# Patient Record
Sex: FEMALE | Race: BLACK OR AFRICAN AMERICAN | Employment: OTHER | URBAN - METROPOLITAN AREA
[De-identification: names, ages, dates, MRNs, and addresses within clinical notes are randomized per-mention and may not be internally consistent; named-entity substitution may affect disease eponyms.]

---

## 2021-10-22 ENCOUNTER — APPOINTMENT (OUTPATIENT)
Dept: GENERAL RADIOLOGY | Facility: CLINIC | Age: 86
End: 2021-10-22
Attending: EMERGENCY MEDICINE
Payer: MEDICARE

## 2021-10-22 ENCOUNTER — APPOINTMENT (OUTPATIENT)
Dept: CT IMAGING | Facility: CLINIC | Age: 86
End: 2021-10-22
Attending: INTERNAL MEDICINE
Payer: MEDICARE

## 2021-10-22 ENCOUNTER — HOSPITAL ENCOUNTER (OUTPATIENT)
Facility: CLINIC | Age: 86
Setting detail: OBSERVATION
Discharge: HOME | End: 2021-10-23
Attending: EMERGENCY MEDICINE | Admitting: INTERNAL MEDICINE
Payer: MEDICARE

## 2021-10-22 DIAGNOSIS — J45.901 EXACERBATION OF ASTHMA, UNSPECIFIED ASTHMA SEVERITY, UNSPECIFIED WHETHER PERSISTENT: ICD-10-CM

## 2021-10-22 LAB
ANION GAP SERPL CALCULATED.3IONS-SCNC: 8 MMOL/L (ref 3–14)
ATRIAL RATE - MUSE: 88 BPM
BASOPHILS # BLD AUTO: 0.1 10E3/UL (ref 0–0.2)
BASOPHILS NFR BLD AUTO: 1 %
BUN SERPL-MCNC: 14 MG/DL (ref 7–30)
CALCIUM SERPL-MCNC: 9.8 MG/DL (ref 8.5–10.1)
CHLORIDE BLD-SCNC: 95 MMOL/L (ref 94–109)
CO2 SERPL-SCNC: 29 MMOL/L (ref 20–32)
CREAT SERPL-MCNC: 0.71 MG/DL (ref 0.52–1.04)
DIASTOLIC BLOOD PRESSURE - MUSE: NORMAL MMHG
EOSINOPHIL # BLD AUTO: 0.3 10E3/UL (ref 0–0.7)
EOSINOPHIL NFR BLD AUTO: 2 %
ERYTHROCYTE [DISTWIDTH] IN BLOOD BY AUTOMATED COUNT: 13.2 % (ref 10–15)
FLUAV RNA SPEC QL NAA+PROBE: NEGATIVE
FLUBV RNA RESP QL NAA+PROBE: NEGATIVE
GFR SERPL CREATININE-BSD FRML MDRD: 75 ML/MIN/1.73M2
GLUCOSE BLD-MCNC: 128 MG/DL (ref 70–99)
HCO3 BLDV-SCNC: 32 MMOL/L (ref 21–28)
HCT VFR BLD AUTO: 41.5 % (ref 35–47)
HGB BLD-MCNC: 13.3 G/DL (ref 11.7–15.7)
IMM GRANULOCYTES # BLD: 0 10E3/UL
IMM GRANULOCYTES NFR BLD: 0 %
INTERPRETATION ECG - MUSE: NORMAL
LACTATE BLD-SCNC: 1.8 MMOL/L
LYMPHOCYTES # BLD AUTO: 2.1 10E3/UL (ref 0.8–5.3)
LYMPHOCYTES NFR BLD AUTO: 18 %
MCH RBC QN AUTO: 27.9 PG (ref 26.5–33)
MCHC RBC AUTO-ENTMCNC: 32 G/DL (ref 31.5–36.5)
MCV RBC AUTO: 87 FL (ref 78–100)
MONOCYTES # BLD AUTO: 0.9 10E3/UL (ref 0–1.3)
MONOCYTES NFR BLD AUTO: 8 %
NEUTROPHILS # BLD AUTO: 8.4 10E3/UL (ref 1.6–8.3)
NEUTROPHILS NFR BLD AUTO: 71 %
NRBC # BLD AUTO: 0 10E3/UL
NRBC BLD AUTO-RTO: 0 /100
NT-PROBNP SERPL-MCNC: 101 PG/ML (ref 0–1800)
P AXIS - MUSE: 84 DEGREES
PCO2 BLDV: 59 MM HG (ref 40–50)
PH BLDV: 7.34 [PH] (ref 7.32–7.43)
PLATELET # BLD AUTO: 353 10E3/UL (ref 150–450)
PO2 BLDV: 37 MM HG (ref 25–47)
POTASSIUM BLD-SCNC: 3.7 MMOL/L (ref 3.4–5.3)
PR INTERVAL - MUSE: 156 MS
QRS DURATION - MUSE: 74 MS
QT - MUSE: 376 MS
QTC - MUSE: 454 MS
R AXIS - MUSE: 62 DEGREES
RBC # BLD AUTO: 4.76 10E6/UL (ref 3.8–5.2)
SAO2 % BLDV: 65 % (ref 94–100)
SARS-COV-2 RNA RESP QL NAA+PROBE: NEGATIVE
SODIUM SERPL-SCNC: 132 MMOL/L (ref 133–144)
SYSTOLIC BLOOD PRESSURE - MUSE: NORMAL MMHG
T AXIS - MUSE: 75 DEGREES
VENTRICULAR RATE- MUSE: 88 BPM
WBC # BLD AUTO: 11.7 10E3/UL (ref 4–11)

## 2021-10-22 PROCEDURE — 82803 BLOOD GASES ANY COMBINATION: CPT

## 2021-10-22 PROCEDURE — 99220 PR INITIAL OBSERVATION CARE,LEVEL III: CPT | Performed by: INTERNAL MEDICINE

## 2021-10-22 PROCEDURE — 83880 ASSAY OF NATRIURETIC PEPTIDE: CPT | Performed by: EMERGENCY MEDICINE

## 2021-10-22 PROCEDURE — G0378 HOSPITAL OBSERVATION PER HR: HCPCS

## 2021-10-22 PROCEDURE — C9803 HOPD COVID-19 SPEC COLLECT: HCPCS

## 2021-10-22 PROCEDURE — 250N000012 HC RX MED GY IP 250 OP 636 PS 637: Performed by: INTERNAL MEDICINE

## 2021-10-22 PROCEDURE — 250N000009 HC RX 250: Performed by: INTERNAL MEDICINE

## 2021-10-22 PROCEDURE — 250N000011 HC RX IP 250 OP 636: Performed by: EMERGENCY MEDICINE

## 2021-10-22 PROCEDURE — 96366 THER/PROPH/DIAG IV INF ADDON: CPT

## 2021-10-22 PROCEDURE — 93005 ELECTROCARDIOGRAM TRACING: CPT

## 2021-10-22 PROCEDURE — 71045 X-RAY EXAM CHEST 1 VIEW: CPT

## 2021-10-22 PROCEDURE — 36415 COLL VENOUS BLD VENIPUNCTURE: CPT | Performed by: EMERGENCY MEDICINE

## 2021-10-22 PROCEDURE — 250N000009 HC RX 250

## 2021-10-22 PROCEDURE — 96375 TX/PRO/DX INJ NEW DRUG ADDON: CPT

## 2021-10-22 PROCEDURE — 96365 THER/PROPH/DIAG IV INF INIT: CPT

## 2021-10-22 PROCEDURE — 80048 BASIC METABOLIC PNL TOTAL CA: CPT | Performed by: EMERGENCY MEDICINE

## 2021-10-22 PROCEDURE — 99285 EMERGENCY DEPT VISIT HI MDM: CPT | Mod: 25

## 2021-10-22 PROCEDURE — 87636 SARSCOV2 & INF A&B AMP PRB: CPT | Performed by: EMERGENCY MEDICINE

## 2021-10-22 PROCEDURE — 85025 COMPLETE CBC W/AUTO DIFF WBC: CPT | Performed by: EMERGENCY MEDICINE

## 2021-10-22 PROCEDURE — 71250 CT THORAX DX C-: CPT

## 2021-10-22 RX ORDER — ACETAMINOPHEN 650 MG/1
650 SUPPOSITORY RECTAL EVERY 6 HOURS PRN
Status: DISCONTINUED | OUTPATIENT
Start: 2021-10-22 | End: 2021-10-23 | Stop reason: HOSPADM

## 2021-10-22 RX ORDER — OMEPRAZOLE 20 MG/1
20 TABLET, DELAYED RELEASE ORAL
COMMUNITY

## 2021-10-22 RX ORDER — BENZONATATE 100 MG/1
100 CAPSULE ORAL 3 TIMES DAILY PRN
Status: DISCONTINUED | OUTPATIENT
Start: 2021-10-22 | End: 2021-10-23 | Stop reason: HOSPADM

## 2021-10-22 RX ORDER — ALBUTEROL SULFATE 90 UG/1
2 AEROSOL, METERED RESPIRATORY (INHALATION) EVERY 6 HOURS PRN
COMMUNITY

## 2021-10-22 RX ORDER — ALBUTEROL SULFATE 0.83 MG/ML
2.5 SOLUTION RESPIRATORY (INHALATION)
Status: DISCONTINUED | OUTPATIENT
Start: 2021-10-22 | End: 2021-10-23 | Stop reason: HOSPADM

## 2021-10-22 RX ORDER — MAGNESIUM SULFATE HEPTAHYDRATE 40 MG/ML
2 INJECTION, SOLUTION INTRAVENOUS ONCE
Status: COMPLETED | OUTPATIENT
Start: 2021-10-22 | End: 2021-10-22

## 2021-10-22 RX ORDER — IPRATROPIUM BROMIDE AND ALBUTEROL SULFATE 2.5; .5 MG/3ML; MG/3ML
SOLUTION RESPIRATORY (INHALATION)
Status: COMPLETED
Start: 2021-10-22 | End: 2021-10-22

## 2021-10-22 RX ORDER — IPRATROPIUM BROMIDE AND ALBUTEROL SULFATE 2.5; .5 MG/3ML; MG/3ML
6 SOLUTION RESPIRATORY (INHALATION) ONCE
Status: COMPLETED | OUTPATIENT
Start: 2021-10-22 | End: 2021-10-22

## 2021-10-22 RX ORDER — ACETAMINOPHEN 325 MG/1
325-650 TABLET ORAL EVERY 6 HOURS PRN
COMMUNITY
End: 2022-12-18

## 2021-10-22 RX ORDER — METHYLPREDNISOLONE SODIUM SUCCINATE 125 MG/2ML
125 INJECTION, POWDER, LYOPHILIZED, FOR SOLUTION INTRAMUSCULAR; INTRAVENOUS ONCE
Status: COMPLETED | OUTPATIENT
Start: 2021-10-22 | End: 2021-10-22

## 2021-10-22 RX ORDER — HYDROCHLOROTHIAZIDE 25 MG/1
25 TABLET ORAL DAILY
COMMUNITY

## 2021-10-22 RX ORDER — ONDANSETRON 2 MG/ML
4 INJECTION INTRAMUSCULAR; INTRAVENOUS EVERY 6 HOURS PRN
Status: DISCONTINUED | OUTPATIENT
Start: 2021-10-22 | End: 2021-10-23 | Stop reason: HOSPADM

## 2021-10-22 RX ORDER — ALBUTEROL SULFATE 0.83 MG/ML
2.5 SOLUTION RESPIRATORY (INHALATION) EVERY 6 HOURS PRN
COMMUNITY

## 2021-10-22 RX ORDER — ONDANSETRON 4 MG/1
4 TABLET, ORALLY DISINTEGRATING ORAL EVERY 6 HOURS PRN
Status: DISCONTINUED | OUTPATIENT
Start: 2021-10-22 | End: 2021-10-23 | Stop reason: HOSPADM

## 2021-10-22 RX ORDER — ACETAMINOPHEN 325 MG/1
650 TABLET ORAL EVERY 6 HOURS PRN
Status: DISCONTINUED | OUTPATIENT
Start: 2021-10-22 | End: 2021-10-23 | Stop reason: HOSPADM

## 2021-10-22 RX ORDER — CODEINE PHOSPHATE AND GUAIFENESIN 10; 100 MG/5ML; MG/5ML
1-2 SOLUTION ORAL EVERY 4 HOURS PRN
COMMUNITY
End: 2022-12-18

## 2021-10-22 RX ORDER — PREDNISONE 20 MG/1
40 TABLET ORAL DAILY
Status: DISCONTINUED | OUTPATIENT
Start: 2021-10-22 | End: 2021-10-23 | Stop reason: HOSPADM

## 2021-10-22 RX ORDER — IPRATROPIUM BROMIDE AND ALBUTEROL SULFATE 2.5; .5 MG/3ML; MG/3ML
3 SOLUTION RESPIRATORY (INHALATION)
Status: DISCONTINUED | OUTPATIENT
Start: 2021-10-22 | End: 2021-10-23 | Stop reason: HOSPADM

## 2021-10-22 RX ADMIN — IPRATROPIUM BROMIDE AND ALBUTEROL SULFATE 3 ML: 2.5; .5 SOLUTION RESPIRATORY (INHALATION) at 17:49

## 2021-10-22 RX ADMIN — MAGNESIUM SULFATE HEPTAHYDRATE 2 G: 40 INJECTION, SOLUTION INTRAVENOUS at 02:03

## 2021-10-22 RX ADMIN — METHYLPREDNISOLONE SODIUM SUCCINATE 125 MG: 125 INJECTION, POWDER, FOR SOLUTION INTRAMUSCULAR; INTRAVENOUS at 02:02

## 2021-10-22 RX ADMIN — IPRATROPIUM BROMIDE AND ALBUTEROL SULFATE 3 ML: 2.5; .5 SOLUTION RESPIRATORY (INHALATION) at 12:16

## 2021-10-22 RX ADMIN — IPRATROPIUM BROMIDE AND ALBUTEROL SULFATE 3 ML: 2.5; .5 SOLUTION RESPIRATORY (INHALATION) at 08:31

## 2021-10-22 RX ADMIN — IPRATROPIUM BROMIDE AND ALBUTEROL SULFATE 6 ML: .5; 3 SOLUTION RESPIRATORY (INHALATION) at 01:57

## 2021-10-22 RX ADMIN — IPRATROPIUM BROMIDE AND ALBUTEROL SULFATE 6 ML: 2.5; .5 SOLUTION RESPIRATORY (INHALATION) at 01:57

## 2021-10-22 RX ADMIN — PREDNISONE 40 MG: 20 TABLET ORAL at 08:30

## 2021-10-22 RX ADMIN — IPRATROPIUM BROMIDE AND ALBUTEROL SULFATE 3 ML: 2.5; .5 SOLUTION RESPIRATORY (INHALATION) at 21:20

## 2021-10-22 ASSESSMENT — ENCOUNTER SYMPTOMS
COUGH: 1
VOMITING: 0
DIARRHEA: 0
SHORTNESS OF BREATH: 1
FEVER: 0

## 2021-10-22 NOTE — ED NOTES
[facility]  ED Nurse Handoff Report    Martha Thomas is a 89 year old female   ED Chief complaint: Wheezing  . ED Diagnosis:   Final diagnoses:   Exacerbation of asthma, unspecified asthma severity, unspecified whether persistent     Allergies:   Allergies   Allergen Reactions     Penicillins      Other reaction(s): Seizures       Code Status: Full Code  Activity level - Baseline/Home:  Independent. Activity Level - Current:   Assist X 1. Lift room needed: No. Bariatric: No   Needed: Yes   Isolation: No. Infection: Not Applicable.     Vital Signs:   Vitals:    10/22/21 0141 10/22/21 0200   BP: (!) 151/80 (!) 146/73   Pulse: 93 87   Resp: 20    Temp: 98.4  F (36.9  C)    TempSrc: Oral    SpO2: 96% 99%       Cardiac Rhythm:  ,   Cardiac  Cardiac Rhythm: Normal sinus rhythm  Pain level:    Patient confused: No. Patient Falls Risk: Yes.   Elimination Status: Has voided   Patient Report - Initial Complaint: Pt to ER with c/o increasing SOB. Focused Assessment:  Pt with hx of asthma, sob worsening despite inhaler at home,  Pt much improved after 2 nebs in ER but cont to have residual wheezing  Tests Performed: Labs CXR . Abnormal Results: WBC 11.7 all others WNLs.   Treatments provided: Meds   Family Comments: son at bedside translating  OBS brochure/video discussed/provided to patient:  Yes  ED Medications:   Medications   magnesium sulfate 2 g in water intermittent infusion (2 g Intravenous New Bag 10/22/21 0203)   ipratropium - albuterol 0.5 mg/2.5 mg/3 mL (DUONEB) neb solution 6 mL (6 mLs Nebulization Given 10/22/21 0157)   methylPREDNISolone sodium succinate (solu-MEDROL) injection 125 mg (125 mg Intravenous Given 10/22/21 0202)     Drips infusing:  No  For the majority of the shift, the patient's behavior Green. Interventions performed wereN/A.    Sepsis treatment initiated: No     Patient tested for COVID 19 prior to admission: YES    ED Nurse Name/Phone Number: Dary Perla RN,    3:14 AM    RECEIVING UNIT ED HANDOFF REVIEW    Above ED Nurse Handoff Report was reviewed: Yes  Reviewed by: Fe Gamboa RN on October 22, 2021 at 2:32 PM

## 2021-10-22 NOTE — PHARMACY-ADMISSION MEDICATION HISTORY
Admission medication history interview status for this patient is complete. See [facility] admission navigator for allergy information, prior to admission medications and immunization status.     Medication history interview done, indicate source(s): Patient and Family  Medication history resources (including written lists, pill bottles, clinic record):None  Pharmacy: [facility]    Changes made to PTA medication list:  Added: All meds  Changed: None  Reported as Not Taking: None  Removed: None    Actions taken by pharmacist (provider contacted, etc):None     Additional medication history information:None    Medication reconciliation/reorder completed by provider prior to medication history?  N   (Y/N)     Prior to Admission medications    Medication Sig Last Dose Taking? Auth Provider   acetaminophen (TYLENOL) 325 MG tablet Take 325-650 mg by mouth every 6 hours as needed for mild pain prn at prn Yes Unknown, Entered By History   albuterol (PROAIR HFA/PROVENTIL HFA/VENTOLIN HFA) 108 (90 Base) MCG/ACT inhaler Inhale 2 puffs into the lungs every 6 hours as needed for shortness of breath / dyspnea or wheezing prn at prn Yes Unknown, Entered By History   albuterol (PROVENTIL) (2.5 MG/3ML) 0.083% neb solution Take 2.5 mg by nebulization every 6 hours as needed for shortness of breath / dyspnea or wheezing prn at prn Yes Unknown, Entered By History   guaiFENesin-codeine (ROBITUSSIN AC) 100-10 MG/5ML solution Take 1-2 teaspoonful by mouth every 4 hours as needed for cough prn at prn Yes Unknown, Entered By History   hydrochlorothiazide (HYDRODIURIL) 25 MG tablet Take 25 mg by mouth daily 10/21/2021 at am Yes Unknown, Entered By History   omeprazole (PRILOSEC OTC) 20 MG EC tablet Take 20 mg by mouth 2 times daily 10/21/2021 at x2 Yes Unknown, Entered By History

## 2021-10-22 NOTE — ED TRIAGE NOTES
Wheezing tonight per family. Family also notes HTN. Audible wheezing in triage. ABCs otherwise intact GCS 15

## 2021-10-22 NOTE — PROGRESS NOTES
Agree with H&P from Valentina Cates MD this morning.  I did evaluate the patient and spoke with her daughter.  She continues to have a dry cough.  Her shortness of breath feels a little bit improved and she is comfortable at rest.  CT of the chest shows some apical scarring that would account for the crackles heard on exam.  -Continue prednisone 40 mg daily  -DuoNebs  -Cough suppressants as needed  -Likely discharge tomorrow if ambulating and not hypoxic

## 2021-10-22 NOTE — H&P
Admitted: 10/22/2021    CHIEF COMPLAINT:  Cough, shortness of breath.    HISTORY OF PRESENT ILLNESS:  History is obtained from the patient as well as family members.  Her grandson assisted with interpretation. This is a 89-year-old Citizen of [place] female who speaks Creole who has an underlying history of asthma and hypertension.  She lives with her daughter.  The patient has been having a nonproductive cough for a couple of weeks, has been taking Tylenol with Codeine however, it has not been getting better.  She has been progressively more short winded with activity and also her blood pressure was noted to be elevated and hence she was brought into the ER for further evaluation.  Per the family, she also had some wheezing.  She denies any pain in her chest.  The patient is vaccinated with COVID Moderna vaccine x 2.  Initially when she first presented here to the ER, there was a fair amount of wheezing, but at no point was she hypoxic.  She is seen by Dr. Lay and I am asked to admit her for further evaluation.    PAST MEDICAL HISTORY:  Significant for hypertension, asthma.    PAST SURGICAL HISTORY:  Significant for cataract surgery.    MEDICATIONS:  Her home medication list includes:  1.  Albuterol inhaler.  2.  Hydrochlorothiazide.    3.  Robitussin.  4.  Albuterol nebs.    SOCIAL HISTORY:  She does not smoke or drink alcohol.    FAMILY HISTORY:  Significant for dementia in her sister.    REVIEW OF SYSTEMS:  The patient denies any chest pain.  She denies any fevers or chills.  She has never been to an intensive care unit or required intubation for asthma. No orthopnea. There has been no specific trigger at this point in time.  All other systems are reviewed and deemed unremarkable and negative.    PHYSICAL EXAMINATION:    VITAL SIGNS:  Her temperature is 98.7, her pulse is 83, her blood pressure is 146/73, respiratory rate 20, O2 sat is 99% on room air.  GENERAL:  She is alert, awake, appears comfortable, in no acute  distress.  HEENT:  Pupils equal, round, reactive to light.  Her pharynx, there is no exudate noted.  LUNGS:  Actually she has crackles in her bases bilaterally.  I do not hear any active wheezing at this point in time.  HEART:  Regular rate.  S1, S2 normal.  ABDOMEN:  Soft, nontender, nondistended with good bowel sounds.  EXTREMITIES:  There is no edema.  SKIN:  There is no rash.   NEUROLOGICAL:  She moves all extremities.    LABORATORY DATA:  Obtained here shows the following:  A basic metabolic panel was obtained, which is grossly unremarkable other than a sodium of 132.  Her lactic acid is 1.8.  Her BNP is 101.  Her venous blood gas showed a pH of 7.34 with a pCO2 of 59, and a bicarbonate of 32.  On a CBC, her white cell count is 11.7 with an absolute neutrophil count of 18.7.  Her influenza and COVID test are read as negative.  An EKG obtained on her shows normal sinus rhythm at 88 beats per minute.  A chest x-ray 1 view showed biapical pleural thickening, basilar fibroid atelectasis.  Remote right clavicle and right rib fractures.    ASSESSMENT AND PLAN:    1.  Acute respiratory distress, likely due to asthma exacerbation; however, seems to be improving.  Nonetheless, she has a fair amount of crackles in her lungs.  Her BNP is normal.  I will admit her under observation status.  I will get a CT of her chest without contrast to further evaluate her lung parenchyma.  For now, we will continue with oral prednisone and neb treatments.  She is not hypoxic.  2.  Hypertension.  She will need resumption of her home meds.    CODE STATUS:  By default will be full code.  This will need to be addressed in the morning with her healthcare agent.    DISPOSITION:  She will be admitted under observation status.    Valentina Cates MD        D: 10/22/2021   T: 10/22/2021   MT: DEEP    Name:     ANISHA ARCHULETA  MRN:      [id number]        Account:     [id number]   :      1931           Admitted:    10/22/2021        Document: [id number]

## 2021-10-22 NOTE — ED PROVIDER NOTES
History     Chief Complaint:  Wheezing      HPI Patient's grandson and daughter over the phone are interpreting for the patient    Martha Thomas is a 89 year old female who presents with difficulty breathing.  Patient has a history of asthma.  Today she became increasingly more short of breath and family members noted audible wheezing.  She was using her albuterol inhaler but without significant improvement.  Patient did not report any additional symptoms including fever, vomiting, diarrhea, chest pain or any other concerns.  Family members checked her blood pressure and noticed that it was elevated at 150-160 systolic.  They were monitoring her home oxygen levels which were were reported as normal.  Patient is fully vaccinated against COVID-19 with Moderna vaccine x2.  Further history limited secondary to respiratory distress.    Review of Systems   Constitutional: Negative for fever.   Respiratory: Positive for cough and shortness of breath.    Cardiovascular: Negative for chest pain.   Gastrointestinal: Negative for diarrhea and vomiting.   All other systems reviewed and are negative.    Allergies:  Penicillin    Medications:    Albuterol  Hydrochlorothiazide  Codeine    Past Medical History:    Asthma  HTN    Surgical History:  Cataract removal    Social History:  Presents to the ED with grandson    Physical Exam     Patient Vitals for the past 24 hrs:   BP Temp Temp src Pulse Resp SpO2   10/22/21 0200 (!) 146/73 -- -- 87 -- 99 %   10/22/21 0141 (!) 151/80 98.4  F (36.9  C) Oral 93 20 96 %       Physical Exam      Eyes:    Conjunctiva normal  Neck:    Supple, no meningismus.     CV:     Regular rate and rhythm.      No murmurs, rubs or gallops.       No unilateral leg swelling.       2+ radial pulses bilateral.       No lower extremity edema.  PULM:    Moderate-severe diffuse expiratory wheezing.       Mild respiratory distress.      Diminished air exchange.     No rales or rhonci.     No stridor.  ABD:     Soft, non-tender, non-distended.       No rebound, guarding or rigidity.  MSK:     No gross deformity to all four extremities.   LYMPH:   No cervical lymphadenopathy.  NEURO:   Alert     Good muscle tone  Skin:    Warm, dry and intact.    Psych:    Mood is good and affect is appropriate.    Emergency Department Course   ECG:  ECG taken at 0157, ECG read at 0206  Normal sinus rhythm   Rate 88 bpm. NV interval 156 ms. QRS duration 74 ms. QT/QTc 376/454 ms. P-R-T axes 84 62 75.     Imaging:  XR Chest Port 1 View:  Cardiomediastinal silhouette is within normal limits. No focal consolidation or pleural effusion. Biapical pleural thickening. Basilar fibroatelectasis. Remote right clavicle and right rib fractures.  Report per radiology.    Laboratory:  CBC: WBC 11.7 (H), HGB 13.3,   BMP: Glucose 128 (H), sodium 132 (L), o/w WNL (Creatinine 0.71)    N-Terminal Pro BNP: 101    iStat gases with lactate (resulted 0209): pH Venous 7.34, PCO2 Venous 59 (H), PO2 Venous 37, Bicarbonate 32 (H), pCO2 Venous 59 (H), O2 saturations 65 (L), Lactate 1.8     Symptomatic SARS-CoV2 (COVID-19) Virus: negative  Influenza A/B Virus: negative     Procedures:  None.    Emergency Department Course:    Reviewed:  I reviewed nursing notes, vitals, past history and care everywhere    Assessments:   I obtained history and examined the patient in room 02 as noted above.   0300 I rechecked the patient and explained findings.     Consults:    I consulted with Dr. Cates of the hospitalist service regarding patient, who agrees to admit patient to hospital in monitored medical bed.     Interventions:  0157 Duoneb 6 mLs nebulization  0202 Solu-medrol 125 mg IV  0203 Magnesium sulfate 2 g IV Bolus    Disposition:  The patient was admitted to the hospital under the care of Dr. Cates.    Impression & Plan      Medical Decision Makin-year-old female presented to the ED with 1 day of difficulty breathing.  She has a history of asthma and has  clear evidence of acute asthma exacerbation.  She was in mild distress with tachypnea, wheezing but no hypoxia.  She responded quite well to bronchodilators and IV Solu-Medrol.  Chest x-ray has ruled out associated pulmonary edema, pneumonia, pneumothorax.  COVID/influenza swabs negative.  On reexamination, she feels much improved, non oxygen dependent but has mild residual wheezing with respiratory rate in the mid to upper 20s.  She is unfit to be discharged home.  She will be admitted to a medical bed for ongoing management of acute asthma exacerbation.    Covid-19  Martha Thomas was evaluated during a global COVID-19 pandemic, which necessitated consideration that the patient might be at risk for infection with the SARS-CoV-2 virus that causes COVID-19. Applicable protocols for evaluation were followed during the patient's care. COVID-19 was considered as part of the patient's evaluation. A test was obtained during this visit.    Diagnosis:    ICD-10-CM    1. Exacerbation of asthma, unspecified asthma severity, unspecified whether persistent  J45.901        Scribe Disclosure:  Niesha KOCH, am serving as a scribe at 1:53 AM on 10/22/2021 to document services personally performed by Dino Lay MD based on my observations and the provider's statements to me.          Dino Lay MD  10/22/21 0331

## 2021-10-22 NOTE — PLAN OF CARE
PRIMARY DIAGNOSIS: ASTHMA  OUTPATIENT/OBSERVATION GOALS TO BE MET BEFORE DISCHARGE:  1. Vital signs stable: Yes    2. Improvement of peak flow to greater than 70% sustained off nebulizer for 4 hours:  N/A    3. Dyspnea improved and O2 sats >88% at RA or at prior home O2 therapy level: Yes      SpO2: 95 %, O2 Device: None (Room air)    4. Short term supplemental O2 needed for use with activity at home: No    5. Tolerating adequate PO diet and medications: Yes    6. Return to near baseline physical activity: Yes    Discharge Planner Nurse   Safe discharge environment identified: Yes  Barriers to discharge: Yes       Entered by: Fe Gamboa 10/22/2021 4:36 PM     Please review provider order for any additional goals.   Nurse to notify provider when observation goals have been met and patient is ready for discharge.     End of Shift Note  See flowsheets for vital signs and complete assessments.    Pertinent Assessments: A&Ox4. O2 stable on room air, OBANDO noted. Denies pain, chest pain, nausea, numbness & tingling. Skin intact. Up SBA.     Major Shift Events: Admitted to the unit.    Treatment Plan: Scheduled nebs, symptom management.      Bedside RN: Fe Gamboa

## 2021-10-22 NOTE — PLAN OF CARE
ROOM # 206-2    Living Situation (if not independent, order SW consult): Independent, home with daughter  Facility name:  :     Activity level at baseline: Independent  Activity level on admit: SBA      Patient registered to observation; given Patient Bill of Rights; given the opportunity to ask questions about observation status and their plan of care.  Patient has been oriented to the observation room, bathroom and call light is in place.    Discussed discharge goals and expectations with patient/family.

## 2021-10-23 VITALS
WEIGHT: 114.3 LBS | HEART RATE: 84 BPM | OXYGEN SATURATION: 94 % | RESPIRATION RATE: 16 BRPM | TEMPERATURE: 97.7 F | DIASTOLIC BLOOD PRESSURE: 56 MMHG | SYSTOLIC BLOOD PRESSURE: 124 MMHG

## 2021-10-23 PROCEDURE — G0378 HOSPITAL OBSERVATION PER HR: HCPCS

## 2021-10-23 PROCEDURE — 250N000009 HC RX 250: Performed by: INTERNAL MEDICINE

## 2021-10-23 PROCEDURE — 99217 PR OBSERVATION CARE DISCHARGE: CPT | Performed by: STUDENT IN AN ORGANIZED HEALTH CARE EDUCATION/TRAINING PROGRAM

## 2021-10-23 PROCEDURE — 250N000012 HC RX MED GY IP 250 OP 636 PS 637: Performed by: INTERNAL MEDICINE

## 2021-10-23 RX ORDER — PREDNISONE 20 MG/1
40 TABLET ORAL DAILY
Qty: 4 TABLET | Refills: 0 | Status: SHIPPED | OUTPATIENT
Start: 2021-10-24 | End: 2022-12-18

## 2021-10-23 RX ADMIN — IPRATROPIUM BROMIDE AND ALBUTEROL SULFATE 3 ML: 2.5; .5 SOLUTION RESPIRATORY (INHALATION) at 09:39

## 2021-10-23 RX ADMIN — PREDNISONE 40 MG: 20 TABLET ORAL at 07:51

## 2021-10-23 NOTE — PLAN OF CARE
PRIMARY DIAGNOSIS: ASTHMA  OUTPATIENT/OBSERVATION GOALS TO BE MET BEFORE DISCHARGE:  1. Vital signs stable: Yes    2. Improvement of peak flow to greater than 70% sustained off nebulizer for 4 hours: N/A    3. Dyspnea improved and O2 sats >88% at RA or at prior home O2 therapy level: Yes      SpO2: 96 %, O2 Device: None (Room air)    4. Short term supplemental O2 needed for use with activity at home: No    5. Tolerating adequate PO diet and medications: Yes    6. Return to near baseline physical activity: Yes    Discharge Planner Nurse   Safe discharge environment identified: Yes  Barriers to discharge: No       Entered by: Fe Gamboa 10/23/2021 8:03 AM     Please review provider order for any additional goals.   Nurse to notify provider when observation goals have been met and patient is ready for discharge.

## 2021-10-23 NOTE — PROGRESS NOTES
Patient's After Visit Summary was reviewed with patient and/or daughter.   Patient verbalized understanding of After Visit Summary, recommended follow up and was given an opportunity to ask questions.   Discharge medications sent home with patient/family: Not applicable   Discharged with daughter

## 2021-10-23 NOTE — DISCHARGE SUMMARY
[facility]  Discharge Summary  Name: Martha Thomas    MRN: [id number]  YOB: 1931    Age: 89 year old  Date of Discharge:  10/23/2021 11:07 AM  Date of Admission: 10/22/2021  Primary Care Provider: Emiliano Yu  Discharge Physician:  Luiz Rowley DO  Discharging Service:  Hospitalist      Hospital Course  Patient is a 89-year-old [place] female who speaks Creole has a past medical history of asthma and hypertension who was admitted on 10/22 with a nonproductive cough for the past couple weeks.  She has been progressively more short winded with activity and her blood pressure is elevated and therefore she presented to the hospital.  She was also found to have some wheezing on admission per report of the family and the ED examination was consistent with this.  At no point she was hypoxemic.  She was admitted to the hospital to be treated for a mild asthma exacerbation.  She was initiated on steroid burst, nebulizers with subsequent improvement in her clinical status.  On the day of discharge she was saturating fine on room air with rest and exertion.  Her daughter also reported that she appeared visibly better.  She was discharged with a total 5-day course of prednisone.    Discharge Diagnoses:  Acute respiratory distress, likely due to asthma exacerbation:  Patient presented with wheezing on examination.  In the setting of an asthma history she was treated for an asthma exacerbation.  Crackles were also noted on her pulmonary examination and therefore a CT chest was completed which was consistent with moderate biapical subpleural scarring and bilateral upper lung subpleural interstitial thickening which may also be consistent with scarring or other interstitial lung disease.  Ultimately she improved on steroid burst and nebulizers and was discharged without need for supplemental oxygen.  Patient follow-up with her primary doctor for ongoing cares.    Hypertension:  She  will need resumption of her home meds.    Discharge Disposition:  Discharged to home    Allergies:  Allergies   Allergen Reactions     Penicillins      Other reaction(s): Seizures        Discharge Medications:        Review of your medicines      START taking      Dose / Directions   predniSONE 20 MG tablet  Commonly known as: DELTASONE  Used for: Exacerbation of asthma, unspecified asthma severity, unspecified whether persistent      Dose: 40 mg  Start taking on: October 24, 2021  Take 2 tablets (40 mg) by mouth daily  Quantity: 4 tablet  Refills: 0        CONTINUE these medicines which have NOT CHANGED      Dose / Directions   acetaminophen 325 MG tablet  Commonly known as: TYLENOL      Dose: 325-650 mg  Take 325-650 mg by mouth every 6 hours as needed for mild pain  Refills: 0     * albuterol 108 (90 Base) MCG/ACT inhaler  Commonly known as: PROAIR HFA/PROVENTIL HFA/VENTOLIN HFA      Dose: 2 puff  Inhale 2 puffs into the lungs every 6 hours as needed for shortness of breath / dyspnea or wheezing  Refills: 0     * albuterol (2.5 MG/3ML) 0.083% neb solution  Commonly known as: PROVENTIL      Dose: 2.5 mg  Take 2.5 mg by nebulization every 6 hours as needed for shortness of breath / dyspnea or wheezing  Refills: 0     guaiFENesin-codeine 100-10 MG/5ML solution  Commonly known as: ROBITUSSIN AC      Dose: 1-2 teaspoonful  Take 1-2 teaspoonful by mouth every 4 hours as needed for cough  Refills: 0     hydrochlorothiazide 25 MG tablet  Commonly known as: HYDRODIURIL  Notes to patient: Resume home schedule      Dose: 25 mg  Take 25 mg by mouth daily  Refills: 0     omeprazole 20 MG EC tablet  Commonly known as: priLOSEC OTC  Notes to patient: Resume home schedule      Dose: 20 mg  Take 20 mg by mouth 2 times daily  Refills: 0         * This list has 2 medication(s) that are the same as other medications prescribed for you. Read the directions carefully, and ask your doctor or other care provider to review them with you.                Where to get your medicines      These medications were sent to [facility] - [place], [place] - [address]  [id number], [facility] [address]    Phone: [phone]     predniSONE 20 MG tablet         Condition on Discharge:  Discharge condition: Good       Code status on discharge: Full Code     History of Illness:  See detailed admission note for full details.    Physical Exam:  Vital signs:  Temp: 97.7  F (36.5  C) Temp src: Oral BP: 124/56 Pulse: 84   Resp: 16 SpO2: 94 % O2 Device: None (Room air)     Weight: 51.8 kg (114 lb 4.8 oz)  There is no height or weight on file to calculate BMI.    Wt Readings from Last 1 Encounters:   10/22/21 51.8 kg (114 lb 4.8 oz)     General: Alert, awake, no acute distress.  HEENT: NC/AT, eyes anicteric, external occular movements intact, face symmetric.  Dentition WNL, MM moist.  Cardiac: RRR, S1, S2.  No murmurs appreciated.  Pulmonary: Normal chest rise, normal work of breathing.  Lungs CTA BL  Abdomen: soft, non-tender, non-distended.  Bowel Sounds Present.  No guarding.  Extremities: no deformities.  Warm, well perfused.  Skin: no rashes or lesions noted.  Warm and Dry.  Neuro: No focal deficits noted.  Speech clear.  Coordination and strength grossly normal.  Psych: Appropriate affect.    Procedures other than Imaging:  None     Imaging:  No results found for this or any previous visit (from the past 24 hour(s)).     Consultations:  No consultations were requested during this admission.       Recent Lab Results:  Recent Labs   Lab 10/22/21  0159   WBC 11.7*   HGB 13.3   HCT 41.5   MCV 87             Lab Results   Component Value Date     10/22/2021    Lab Results   Component Value Date    CHLORIDE 95 10/22/2021    Lab Results   Component Value Date    BUN 14 10/22/2021      Lab Results   Component Value Date    POTASSIUM 3.7 10/22/2021    Lab Results   Component Value Date    CO2 29 10/22/2021    Lab Results   Component  Value Date    CR 0.71 10/22/2021             Pending Results:    Unresulted Labs Ordered in the Past 30 Days of this Admission     No orders found for last 31 day(s).           Discharge Instructions and Follow-Up:   Discharge Procedure Orders   Reason for your hospital stay   Order Comments: You were in the hospital to be treated for an asthma exacerbation. This was treated with steroids by mouth and nebulizer. You have improved. You will continue to take oral steroids for 4 more days. Please complete this medication.     Follow-up and recommended labs and tests    Order Comments: [facility]- [facility].  No follow up labs or test are needed.     Activity   Order Comments: Your activity upon discharge: activity as tolerated     Order Specific Question Answer Comments   Is discharge order? Yes      Diet   Order Comments: Follow this diet upon discharge: Orders Placed This Encounter      Regular Diet Adult     Order Specific Question Answer Comments   Is discharge order? Yes        Total time spent in face to face contact with the patient and coordinating discharge was:  <30 Minutes.    Luiz Rowley, DO

## 2021-10-23 NOTE — PLAN OF CARE
PRIMARY DIAGNOSIS: ASTHMA  OUTPATIENT/OBSERVATION GOALS TO BE MET BEFORE DISCHARGE:  1. Vital signs stable: Yes    2. Improvement of peak flow to greater than 70% sustained off nebulizer for 4 hours: NA    3. Dyspnea improved and O2 sats >88% at RA or at prior home O2 therapy level: Yes      SpO2: 96 %, O2 Device: None (Room air)    4. Short term supplemental O2 needed for use with activity at home: No    5. Tolerating adequate PO diet and medications: Yes    6. Return to near baseline physical activity: Yes    Vitals: BP (!) 142/62 (BP Location: Right arm)   Pulse 94   Temp 97.6  F (36.4  C) (Oral)   Resp 18   Wt 51.8 kg (114 lb 4.8 oz)   SpO2 96%   BMI= There is no height or weight on file to calculate BMI.    Pt. A/O, SBA, Calm/cooperative denies pain, SOB, stable on RA, sats in mid 90's and VSS has daughter at bedside for . Sleeping at this time, will continue supportive cares and POC.  Discharge Planner Nurse   Safe discharge environment identified: Yes  Barriers to discharge: No       Entered by: Jayesh Mccracken 10/23/2021 5:20 AM     Please review provider order for any additional goals.   Nurse to notify provider when observation goals have been met and patient is ready for discharge.

## 2021-10-23 NOTE — PLAN OF CARE
PRIMARY DIAGNOSIS: ASTHMA  OUTPATIENT/OBSERVATION GOALS TO BE MET BEFORE DISCHARGE:  1. Vital signs stable: Yes    2. Improvement of peak flow to greater than 70% sustained off nebulizer for 4 hours: NA    3. Dyspnea improved and O2 sats >88% at RA or at prior home O2 therapy level: Yes      SpO2: 96 %, O2 Device: None (Room air)    4. Short term supplemental O2 needed for use with activity at home: No    5. Tolerating adequate PO diet and medications: Yes    6. Return to near baseline physical activity: Yes    Vitals: /53 (BP Location: Right arm)   Pulse 92   Temp 97.8  F (36.6  C) (Oral)   Resp 18   Wt 51.8 kg (114 lb 4.8 oz)   SpO2 96%   BMI= There is no height or weight on file to calculate BMI.    Pt. A/O, SBA, Calm/cooperative denies pain, SOB, stable on RA, sats in mid 90's and  VSS, has daughter at bedside for   Discharge Planner Nurse   Safe discharge environment identified: Yes  Barriers to discharge: No       Entered by: Jayesh Mccracken 10/22/2021 10:43 PM     Please review provider order for any additional goals.   Nurse to notify provider when observation goals have been met and patient is ready for discharge.

## 2022-12-17 ENCOUNTER — APPOINTMENT (OUTPATIENT)
Dept: CT IMAGING | Facility: CLINIC | Age: 87
End: 2022-12-17
Attending: EMERGENCY MEDICINE
Payer: MEDICARE

## 2022-12-17 ENCOUNTER — HOSPITAL ENCOUNTER (EMERGENCY)
Facility: CLINIC | Age: 87
Discharge: HOME | End: 2022-12-18
Attending: EMERGENCY MEDICINE | Admitting: EMERGENCY MEDICINE
Payer: MEDICARE

## 2022-12-17 DIAGNOSIS — K82.8 CYST OF GALLBLADDER: ICD-10-CM

## 2022-12-17 DIAGNOSIS — R93.89 THICKENED ENDOMETRIUM: ICD-10-CM

## 2022-12-17 DIAGNOSIS — K80.20 CALCULUS OF GALLBLADDER WITHOUT CHOLECYSTITIS WITHOUT OBSTRUCTION: ICD-10-CM

## 2022-12-17 DIAGNOSIS — N28.1 RENAL CYST, RIGHT: ICD-10-CM

## 2022-12-17 DIAGNOSIS — N89.8 VAGINAL DISCHARGE: ICD-10-CM

## 2022-12-17 LAB
ALBUMIN UR-MCNC: NEGATIVE MG/DL
ALBUMIN UR-MCNC: NEGATIVE MG/DL
ANION GAP SERPL CALCULATED.3IONS-SCNC: 12 MMOL/L (ref 7–15)
APPEARANCE UR: CLEAR
APPEARANCE UR: CLEAR
BASOPHILS # BLD AUTO: 0.1 10E3/UL (ref 0–0.2)
BASOPHILS NFR BLD AUTO: 1 %
BILIRUB UR QL STRIP: NEGATIVE
BILIRUB UR QL STRIP: NEGATIVE
BUN SERPL-MCNC: 19.3 MG/DL (ref 8–23)
CALCIUM SERPL-MCNC: 10.2 MG/DL (ref 8.2–9.6)
CHLORIDE SERPL-SCNC: 97 MMOL/L (ref 98–107)
CLUE CELLS: ABNORMAL
COLOR UR AUTO: ABNORMAL
COLOR UR AUTO: ABNORMAL
CREAT BLD-MCNC: 0.9 MG/DL (ref 0.5–1)
CREAT SERPL-MCNC: 0.74 MG/DL (ref 0.51–0.95)
DEPRECATED HCO3 PLAS-SCNC: 28 MMOL/L (ref 22–29)
EOSINOPHIL # BLD AUTO: 0.1 10E3/UL (ref 0–0.7)
EOSINOPHIL NFR BLD AUTO: 1 %
ERYTHROCYTE [DISTWIDTH] IN BLOOD BY AUTOMATED COUNT: 13.3 % (ref 10–15)
GFR SERPL CREATININE-BSD FRML MDRD: 60 ML/MIN/1.73M2
GFR SERPL CREATININE-BSD FRML MDRD: 76 ML/MIN/1.73M2
GLUCOSE SERPL-MCNC: 101 MG/DL (ref 70–99)
GLUCOSE UR STRIP-MCNC: NEGATIVE MG/DL
GLUCOSE UR STRIP-MCNC: NEGATIVE MG/DL
HCO3 BLDV-SCNC: 31 MMOL/L (ref 21–28)
HCT VFR BLD AUTO: 43.8 % (ref 35–47)
HGB BLD-MCNC: 14.2 G/DL (ref 11.7–15.7)
HGB UR QL STRIP: NEGATIVE
HGB UR QL STRIP: NEGATIVE
HOLD SPECIMEN: NORMAL
HOLD SPECIMEN: NORMAL
IMM GRANULOCYTES # BLD: 0 10E3/UL
IMM GRANULOCYTES NFR BLD: 0 %
KETONES UR STRIP-MCNC: NEGATIVE MG/DL
KETONES UR STRIP-MCNC: NEGATIVE MG/DL
LACTATE BLD-SCNC: 1.6 MMOL/L
LEUKOCYTE ESTERASE UR QL STRIP: ABNORMAL
LEUKOCYTE ESTERASE UR QL STRIP: ABNORMAL
LYMPHOCYTES # BLD AUTO: 3.3 10E3/UL (ref 0.8–5.3)
LYMPHOCYTES NFR BLD AUTO: 34 %
MCH RBC QN AUTO: 28.9 PG (ref 26.5–33)
MCHC RBC AUTO-ENTMCNC: 32.4 G/DL (ref 31.5–36.5)
MCV RBC AUTO: 89 FL (ref 78–100)
MONOCYTES # BLD AUTO: 0.7 10E3/UL (ref 0–1.3)
MONOCYTES NFR BLD AUTO: 7 %
MUCOUS THREADS #/AREA URNS LPF: PRESENT /LPF
MUCOUS THREADS #/AREA URNS LPF: PRESENT /LPF
NEUTROPHILS # BLD AUTO: 5.5 10E3/UL (ref 1.6–8.3)
NEUTROPHILS NFR BLD AUTO: 57 %
NITRATE UR QL: NEGATIVE
NITRATE UR QL: NEGATIVE
NRBC # BLD AUTO: 0 10E3/UL
NRBC BLD AUTO-RTO: 0 /100
PCO2 BLDV: 57 MM HG (ref 40–50)
PH BLDV: 7.34 [PH] (ref 7.32–7.43)
PH UR STRIP: 6.5 [PH] (ref 5–7)
PH UR STRIP: 6.5 [PH] (ref 5–7)
PLATELET # BLD AUTO: 305 10E3/UL (ref 150–450)
PO2 BLDV: 30 MM HG (ref 25–47)
POTASSIUM SERPL-SCNC: 4.7 MMOL/L (ref 3.4–5.3)
RBC # BLD AUTO: 4.92 10E6/UL (ref 3.8–5.2)
RBC URINE: 4 /HPF
RBC URINE: 4 /HPF
SAO2 % BLDV: 51 % (ref 94–100)
SODIUM SERPL-SCNC: 137 MMOL/L (ref 136–145)
SP GR UR STRIP: 1.02 (ref 1–1.03)
SP GR UR STRIP: 1.02 (ref 1–1.03)
SQUAMOUS EPITHELIAL: 1 /HPF
SQUAMOUS EPITHELIAL: 1 /HPF
TRICHOMONAS, WET PREP: ABNORMAL
UROBILINOGEN UR STRIP-MCNC: NORMAL MG/DL
UROBILINOGEN UR STRIP-MCNC: NORMAL MG/DL
WBC # BLD AUTO: 9.6 10E3/UL (ref 4–11)
WBC URINE: 1 /HPF
WBC URINE: 1 /HPF
WBC'S/HIGH POWER FIELD, WET PREP: ABNORMAL
YEAST, WET PREP: ABNORMAL

## 2022-12-17 PROCEDURE — 85025 COMPLETE CBC W/AUTO DIFF WBC: CPT | Performed by: EMERGENCY MEDICINE

## 2022-12-17 PROCEDURE — 80048 BASIC METABOLIC PNL TOTAL CA: CPT | Performed by: EMERGENCY MEDICINE

## 2022-12-17 PROCEDURE — 83605 ASSAY OF LACTIC ACID: CPT

## 2022-12-17 PROCEDURE — 82803 BLOOD GASES ANY COMBINATION: CPT

## 2022-12-17 PROCEDURE — 81001 URINALYSIS AUTO W/SCOPE: CPT | Performed by: EMERGENCY MEDICINE

## 2022-12-17 PROCEDURE — 87210 SMEAR WET MOUNT SALINE/INK: CPT | Mod: XU | Performed by: EMERGENCY MEDICINE

## 2022-12-17 PROCEDURE — 82565 ASSAY OF CREATININE: CPT

## 2022-12-17 PROCEDURE — 99285 EMERGENCY DEPT VISIT HI MDM: CPT | Mod: 25

## 2022-12-17 PROCEDURE — G1010 CDSM STANSON: HCPCS

## 2022-12-17 PROCEDURE — 250N000009 HC RX 250: Performed by: EMERGENCY MEDICINE

## 2022-12-17 PROCEDURE — 250N000011 HC RX IP 250 OP 636: Performed by: EMERGENCY MEDICINE

## 2022-12-17 PROCEDURE — 87661 TRICHOMONAS VAGINALIS AMPLIF: CPT | Performed by: EMERGENCY MEDICINE

## 2022-12-17 PROCEDURE — 36415 COLL VENOUS BLD VENIPUNCTURE: CPT | Performed by: EMERGENCY MEDICINE

## 2022-12-17 PROCEDURE — 81003 URINALYSIS AUTO W/O SCOPE: CPT | Performed by: EMERGENCY MEDICINE

## 2022-12-17 PROCEDURE — 87086 URINE CULTURE/COLONY COUNT: CPT | Performed by: EMERGENCY MEDICINE

## 2022-12-17 RX ORDER — IOPAMIDOL 755 MG/ML
500 INJECTION, SOLUTION INTRAVASCULAR ONCE
Status: COMPLETED | OUTPATIENT
Start: 2022-12-17 | End: 2022-12-17

## 2022-12-17 RX ADMIN — IOPAMIDOL 58 ML: 755 INJECTION, SOLUTION INTRAVENOUS at 22:50

## 2022-12-17 RX ADMIN — SODIUM CHLORIDE 54 ML: 9 INJECTION, SOLUTION INTRAVENOUS at 22:50

## 2022-12-17 ASSESSMENT — ENCOUNTER SYMPTOMS
DIARRHEA: 0
FREQUENCY: 1
DYSURIA: 0
VOMITING: 0
BLOOD IN STOOL: 0
FEVER: 1
NAUSEA: 0

## 2022-12-17 ASSESSMENT — ACTIVITIES OF DAILY LIVING (ADL)
ADLS_ACUITY_SCORE: 33
ADLS_ACUITY_SCORE: 35

## 2022-12-18 ENCOUNTER — APPOINTMENT (OUTPATIENT)
Dept: ULTRASOUND IMAGING | Facility: CLINIC | Age: 87
End: 2022-12-18
Attending: EMERGENCY MEDICINE
Payer: MEDICARE

## 2022-12-18 ENCOUNTER — APPOINTMENT (OUTPATIENT)
Dept: CT IMAGING | Facility: CLINIC | Age: 87
End: 2022-12-18
Attending: EMERGENCY MEDICINE
Payer: MEDICARE

## 2022-12-18 VITALS
TEMPERATURE: 98.6 F | OXYGEN SATURATION: 98 % | HEART RATE: 71 BPM | SYSTOLIC BLOOD PRESSURE: 122 MMHG | DIASTOLIC BLOOD PRESSURE: 63 MMHG | RESPIRATION RATE: 18 BRPM

## 2022-12-18 LAB — T VAGINALIS DNA SPEC QL NAA+PROBE: NOT DETECTED

## 2022-12-18 PROCEDURE — 76705 ECHO EXAM OF ABDOMEN: CPT

## 2022-12-18 PROCEDURE — G1010 CDSM STANSON: HCPCS

## 2022-12-18 PROCEDURE — 250N000011 HC RX IP 250 OP 636: Performed by: EMERGENCY MEDICINE

## 2022-12-18 PROCEDURE — 250N000009 HC RX 250: Performed by: EMERGENCY MEDICINE

## 2022-12-18 PROCEDURE — 76830 TRANSVAGINAL US NON-OB: CPT

## 2022-12-18 RX ORDER — CLINDAMYCIN HCL 300 MG
300 CAPSULE ORAL 3 TIMES DAILY
Qty: 21 CAPSULE | Refills: 0 | Status: SHIPPED | OUTPATIENT
Start: 2022-12-18 | End: 2022-12-25

## 2022-12-18 RX ORDER — FLUTICASONE PROPIONATE 50 MCG
1 SPRAY, SUSPENSION (ML) NASAL DAILY
COMMUNITY

## 2022-12-18 RX ORDER — IOPAMIDOL 755 MG/ML
500 INJECTION, SOLUTION INTRAVASCULAR ONCE
Status: COMPLETED | OUTPATIENT
Start: 2022-12-18 | End: 2022-12-18

## 2022-12-18 RX ADMIN — IOPAMIDOL 58 ML: 755 INJECTION, SOLUTION INTRAVENOUS at 00:41

## 2022-12-18 RX ADMIN — SODIUM CHLORIDE 59 ML: 9 INJECTION, SOLUTION INTRAVENOUS at 00:41

## 2022-12-18 ASSESSMENT — ACTIVITIES OF DAILY LIVING (ADL): ADLS_ACUITY_SCORE: 35

## 2022-12-18 NOTE — DISCHARGE INSTRUCTIONS
Your workup raised concern for a vaginal infection.  We will start antibiotics for this.  Follow up with gynecology.  The uterus was noted to be thick, this is likely due to infection, but can sometimes represent cancer.  Follow up with gynecology to consider biopsy after infection treated.      You were also noted to have gallstones, a gallbladder cyst and a right kidney cyst.  These are all incidental findings and not concerning.

## 2022-12-18 NOTE — ED TRIAGE NOTES
Pt does not speak English, speaks Creole. Presents with daughter. Daughter reports pt has had a pessary x 10 years. Recently was removed due to infection. Now daughter reports increased pain with urination, urinary frequency, and discharge. Symptoms started yesterday. Reports slight fever at home. Daughter also reports increased confusion. VSS. A/Ox4.

## 2022-12-18 NOTE — ED NOTES
Imaging follow up:    See studies below.  In regards to the concerns raised by CT of the abdomen.    1.  SVC is widely patent.  No vascular occlusion or other concerns.   2.  RUQ ultrasound shows cholelithiasis and gallbladder cyst without concern for obstruction or infection.    3.  The right renal lesion is characterized on this study as a simple appearing cyst.    4.  The endometrium is thickened.  In the setting of vaginal infection as detailed in Dr Garcia's HPI/PE, this could certainly be reactive.  It is also possible this could represent underlying neoplastic disease.  Plan after discussed with patient and family will be for PO Clindamycin to treat infection.  Agree with plan to lease pessary out.  They are scheduled to see gynecology for follow up on 1/4.  The patient's family member will call tomorrow to try to move this up.  Recommended discussing endometrial biopsy as an option for further evaluation.      Diagnosis:  (N89.8) Vaginal discharge/Infection    (K80.20) Calculus of gallbladder     (K82.8) Cyst of gallbladder    (R93.89) Thickened endometrium - infection vs neoplasm    (N28.1) Renal cyst, right      CT Chest:  IMPRESSION:   1.  Study degraded due to motion artifact. Scarring in the apices. Slight diffuse thickening of the bronchi. No suspicious adenopathy or pleural effusion on either side. No pulmonary emboli on either side.  2.  SVC is widely patent. Normal caliber thoracic aorta without aneurysm or dissection. Cardiac size approaches upper limits of normal. No pericardial effusion.  3.  Right renal cortical simple cyst, no specific follow-up required.  4.  Mild degenerative changes both shoulders and the spine. Mild left convex thoracolumbar curve. Mild thoracic kyphosis.    RUQ Ultrasound:  IMPRESSION:  1.  Mobile gallstones without biliary dilatation or acute inflammatory signs.  2.  Mildly complex cortical cyst at the upper pole of the right kidney which can be followed with targeted  ultrasound in 6 months to ensure stability.  3.  Pancreas partially visualized due to overlying bowel gas, grossly normal where seen.    Pelvic Ultrasound:  IMPRESSION:   1.  The endometrial stripe is mildly heterogeneous, measuring 0.8 cm in thickness. This endometrial thickness is abnormal for a postmenopausal female. An endometrial biopsy could be considered for further evaluation.  2.  The ovaries are not visualized.        Bhupinder Morrison MD  12/18/22 0138

## 2022-12-18 NOTE — ED PROVIDER NOTES
History   Chief Complaint:  Urinary Frequency    The history is provided by the patient and a relative. A  was used (daughter).      Martha Ocampo is a 89 year old female with history of uterine prolapse, pessary maintenance who presents with urinary frequency. Martha was seen by her Ob/Gyn yesterday regarding her pessary. Her pessary was removed and a large amount of yellow-green discharge was noted. The Ob/Gyn plan is as follows: leave pessary out, continue estriol 2 x per week, return to clinic for replacement in 3 weeks, bring estriol cream with patient for next visit to place high in vagina. Martha's daughter, Naty, reports exacerbated abdominopelvic and side pain since yesterday. She additionally reports a fever this morning and additional green-colored vaginal discharge. Denies nausea, vomiting, diarrhea, blood in stool, and dysuria. Denies use of Tylenol today.     Review of Systems   Constitutional: Positive for fever.   Gastrointestinal: Negative for blood in stool, diarrhea, nausea and vomiting.   Genitourinary: Positive for frequency and vaginal discharge. Negative for dysuria.   All other systems reviewed and are negative.    Allergies:  Penicillins    Medications:  Albuterol   Robitussin   Hydrochlorothiazide   Omeprazole   Prednisone     Past Medical History:     GERD  Hypertension   Uterine prolapse  Pessary maintenance   Asthma     Family History:    Sister- dementia     Social History:  The patient presents to the ED with her daughter, Naty.   PCP: Chelle Montero MD    Physical Exam     Patient Vitals for the past 24 hrs:   BP Temp Temp src Pulse Resp SpO2   12/17/22 2150 139/70 -- -- 67 -- 98 %   12/17/22 1947 (!) 161/72 98.6  F (37  C) Temporal 87 18 99 %     Physical Exam  General: Patient is awake, alert and interactive when I enter the room, appears uncomfortable  Eyes: Conjunctivae and sclerae are normal  CV: Regular rate. S1/S2. No murmurs.    Resp: Lungs are clear without wheezes or rales. No respiratory distress.   GI: Abdomen is soft, no rigidity, guarding, or rebound. No distension. LLQ tenderness    : External exam: no lesions, no erythema/cellulitis  Internal exam: Normal introitus. No evidence of erythema, ecchymosis or lesions.   Cervical appears erythematous with evidence of friable tissue.   Copious white, yellow vaginal discharge noted.   Small amount of blood noted.  Tender cervix   MS: moving all extremities.   Skin: No rash or lesions noted. Normal capillary refill noted  Neuro: Speech is normal and fluent. Face is symmetric. Moving all extremities.   Psych:  Normal affect.  Appropriate interactions.    Emergency Department Course     Imaging:  CT Abdomen Pelvis w Contrast   Final Result   IMPRESSION:    1.  Region of hyperenhancement in segment IVb measuring 1.2 cm. A branch of the left portal vein appears to be connected to this region, thus favoring a vascular origin. This could be seen in the setting of superior vena cava occlusion. Recommend further    evaluation with contrast enhanced chest CT.   2.  Irregularity of the gallbladder fundus. Further evaluation with ultrasound is recommended.   3.  Dilatation of the common bile duct measuring up to 1.1 cm. While this could be related to patient's age, an MRCP can be considered.   4.  Hypodense lesion measuring up to 2.6 cm and the right kidney containing internal enhancing foci concerning for renal cell carcinoma. Further evaluation with MRI is recommended.   5.  Slight asymmetric thickening of the right aspect of the urinary bladder wall which could be further evaluated with cystoscopy.   6.  Nonspecific enhancement and thickening of the endometrium measuring up to 0.8 cm. Further evaluation with ultrasound and/or endometrial sampling is recommended to exclude an underlying malignancy.      Abdomen US, limited (RUQ only)    (Results Pending)   US Pelvic Complete with Transvaginal     (Results Pending)   CT Chest w Contrast    (Results Pending)     Report per radiology    Laboratory:  Labs Ordered and Resulted from Time of ED Arrival to Time of ED Departure   ROUTINE UA WITH MICROSCOPIC REFLEX TO CULTURE - Abnormal       Result Value    Color Urine Light Yellow      Appearance Urine Clear      Glucose Urine Negative      Bilirubin Urine Negative      Ketones Urine Negative      Specific Gravity Urine 1.020      Blood Urine Negative      pH Urine 6.5      Protein Albumin Urine Negative      Urobilinogen Urine Normal      Nitrite Urine Negative      Leukocyte Esterase Urine Moderate (*)     Mucus Urine Present (*)     RBC Urine 4 (*)     WBC Urine 1      Squamous Epithelials Urine 1     UA MACROSCOPIC WITH REFLEX TO MICRO AND CULTURE - Abnormal    Color Urine Light Yellow      Appearance Urine Clear      Glucose Urine Negative      Bilirubin Urine Negative      Ketones Urine Negative      Specific Gravity Urine 1.020      Blood Urine Negative      pH Urine 6.5      Protein Albumin Urine Negative      Urobilinogen Urine Normal      Nitrite Urine Negative      Leukocyte Esterase Urine Moderate (*)     Mucus Urine Present (*)     RBC Urine 4 (*)     WBC Urine 1      Squamous Epithelials Urine 1     BASIC METABOLIC PANEL - Abnormal    Sodium 137      Potassium 4.7      Chloride 97 (*)     Carbon Dioxide (CO2) 28      Anion Gap 12      Urea Nitrogen 19.3      Creatinine 0.74      Calcium 10.2 (*)     Glucose 101 (*)     GFR Estimate 76     ISTAT GASES LACTATE VENOUS POCT - Abnormal    Lactic Acid POCT 1.6      Bicarbonate Venous POCT 31 (*)     O2 Sat, Venous POCT 51 (*)     pCO2V Venous POCT 57 (*)     pH Venous POCT 7.34      pO2 Venous POCT 30     ISTAT CREATININE POCT - Abnormal    Creatinine POCT 0.9      GFR, ESTIMATED POCT 60 (*)    WET PREPARATION - Abnormal    Trichomonas Absent      Yeast Absent      Clue Cells Absent      WBCs/high power field 3+ (*)    CBC WITH PLATELETS AND DIFFERENTIAL     WBC Count 9.6      RBC Count 4.92      Hemoglobin 14.2      Hematocrit 43.8      MCV 89      MCH 28.9      MCHC 32.4      RDW 13.3      Platelet Count 305      % Neutrophils 57      % Lymphocytes 34      % Monocytes 7      % Eosinophils 1      % Basophils 1      % Immature Granulocytes 0      NRBCs per 100 WBC 0      Absolute Neutrophils 5.5      Absolute Lymphocytes 3.3      Absolute Monocytes 0.7      Absolute Eosinophils 0.1      Absolute Basophils 0.1      Absolute Immature Granulocytes 0.0      Absolute NRBCs 0.0     URINE CULTURE   TRICHOMONAS VAGINALIS BY PCR     Emergency Department Course:       Reviewed:  I reviewed nursing notes, vitals, past medical history and Care Everywhere    Assessments:  2105 I obtained history and examined the patient as noted above.   2215 Pelvic exam performed. Chaperoned by Letty Murphy RN.    I rechecked the patient and discussed findings.     Interventions:  Medications   iopamidol (ISOVUE-370) solution 500 mL (58 mLs Intravenous Given 12/17/22 2250)   Sodium Chloride for CT Scan Flush Use (54 mLs Intravenous Given 12/17/22 2250)     Disposition:  Signed out to epic colleague pending further evaluation    Impression & Plan     Medical Decision Making:  Patient is a 89-year-old female who presents to the emergency department with pelvic pain, left flank pain and vaginal discharge.  Patient was seen by her OB/GYN who noted significant vaginal discharge and had her pessary removed.  However no antibiotics were started at that time.  Patient comes in here today with ongoing discomfort and discharge.  On initial physical exam she is hemodynamically stable with no vital signs.  She is afebrile.  She is oxygenating well on room air.  Overall she is well-appearing but does appear uncomfortable.  On pelvic exam there is copious amounts of white-yellowish discharge in the posterior portion of the vaginal vault.  Cervix appears erythematous and there is some surrounding friable  tissue that has some mild oozing bleeding.  Cervix is tender to palpation.  Wet prep was performed and was negative.  UA was also negative.  Blood work was obtained which shows no evidence of significant leukocytosis or lactic acidosis.  However CT scan was notable for numerous incidental findings of unclear consequence at this juncture.  Notably radiologist brings up concern for possible superior vena cava occlusion therefore CT contrasted CT scan of the chest will be obtained.  Also questionable biliary pathology with abnormal appearing gallbladder and enlarged common bile duct.  Therefore right upper quadrant ultrasound will be obtained to rule out evidence of biliary obstruction or cholelithiasis.  Also question of renal mass concerning for possible renal cell carcinoma.  Patient will require further urology evaluation for this finding.  There is also thickening of the endometrium seen on CT scan.  We will obtain the pelvic ultrasound to rule out pelvic abscess or further infectious pathology.  This also may represent a neoplasm.  My partner Dr. Morrison will follow up on the imaging that has been ordered to further evaluate her CT findings.  Ultimately disposition will be based on further evaluation.    Diagnosis:    ICD-10-CM    1. Vaginal discharge  N89.8           scribe Disclosure:  ITamar, am serving as a scribe at 8:55 PM on 12/17/2022 to document services personally performed by Indra Garcia MD based on my observations and the provider's statements to me.      Indra Garcia MD  12/18/22 0010

## 2022-12-18 NOTE — ED PROVIDER NOTES
PIT/Triage Evaluation    Patient presented with dysuria/urgency/frequency for the last 2 days after having a pessary removed by her GYN doctor 2 days ago.  Patient's had low-grade fever with T-max 99.  Patient also reports having creamy white vaginal discharge.  Reports suprapubic pain and left flank pain that started yesterday.  No nausea or vomiting, diarrhea or constipation.    Exam is notable for  General: the patient is awake and interactive  HEENT:  Moist mucous membranes, conjunctiva normal  Pulmonary:  Normal respiratory effort  Cardiovascular:  Well perfused  Abdomen: Soft, mild suprapubic tenderness; no guarding or rebound tenderness.  Musculoskeletal:  Moving 4 extremities grossly wnl, no deformities  Neuro:  Speech normal, no focal deficits    Appropriate interventions for symptom management were initiated if applicable.  Appropriate diagnostic tests were initiated if indicated.    I briefly evaluated the patient and developed an initial plan of care. I discussed this plan and explained that this brief interaction does not constitute a full evaluation. Patient/family understands that they should wait to be fully evaluated and discuss any test results with another clinician prior to leaving the hospital.      Paco Moeller MD  12/17/22 2034

## 2022-12-19 LAB — BACTERIA UR CULT: NORMAL
